# Patient Record
(demographics unavailable — no encounter records)

---

## 2025-01-13 NOTE — PHYSICAL EXAM
[TextEntry] : PHYSICAL EXAMINATION: Vital signs from today's encounter reviewed.  GENERAL: No acute distress, clinically eukinetic, normal appearance HEAD: Normocephalic, atraumatic EYES: conjunctivae are pink and moist, no icterus, no proptosis  NECK: thyroid is not enlarged/nodular on palpation, non-tender, no adenopathy RESPIRATORY: normal chest expansion with good pulmonary effort, no acute respiratory distress MUSCULOSKELETAL: no swelling, normal range of motion, normal gait SKIN: no pallor, no icterus, no rash  NEUROLOGIC: alert and oriented, no evident focal deficits, no tremors  PSYCHIATRIC: mood and affect are normal

## 2025-01-13 NOTE — HISTORY OF PRESENT ILLNESS
[FreeTextEntry1] : CHIEF COMPLAINT: Thyroid nodules   HISTORY OF PRESENTING ILLNESS: The patient is a 49-year-old female being seen in the office today for evaluation of thyroid nodules.  She was initially noted to have thyroid nodules on MRI cervical spine in 4/2023, done for symptoms of paresthesias. Paresthesias have since resolved, after starting vitamin B12 supplements.  Subsequent thyroid US 7/21/2023 (images personally reviewed): Bilateral cysts and spongiform nodules.  Right lobe colloid cysts up to 4 mm, LMP 0.7 cm spongiform nodule, LLP 0.5 cm cyst. Thyroid US 1/5/2025: Bilateral colloid cysts and nodules, largest left upper pole cyst with debris 6 to 7 mm, unchanged.   Symptoms: Endorses hot flashes, decreased energy and brain fog.  Recently started HRT with GYN.  No palpitations or tremors.  Endorses aches and pains all over her body.  No constipation or diarrhea.  She is perimenopausal. Compressive symptoms: No neck swelling, no dysphagia, no dyspnea, no change in voice.   Family history: Mother with Hashimoto's disease, subclinical hypothyroidism. No history of biotin intake. No personal history of radiation exposure in the head and neck area. No prior treatment with thyroid medications.  Normal TSH from 7/2024.  Family history: Mother with hyperlipidemia, early menopause at age 42, osteoporosis, Hashimoto's thyroiditis, subclinical hypothyroidism, Sjogren's syndrome, soft tissue sarcoma.  Father with hyperlipidemia.  Social history: She is a psychologist.  Lives with her  and children.   is a neuropsychologist.

## 2025-01-13 NOTE — ASSESSMENT
[FreeTextEntry1] : 1.  Subcentimeter thyroid nodules Thyroid US 7/21/2023: Bilateral cysts and spongiform nodules. Right lobe colloid cysts up to 4 mm, LMP 0.7 cm spongiform nodule, LLP 0.5 cm cyst. Thyroid US 1/5/2025: Bilateral colloid cysts and nodules, largest left upper pole cyst with debris 6 to 7 mm, unchanged. RECOMMENDATIONS: -We discussed that she has bilateral cysts and nonsuspicious subcentimeter nodules.  Do not meet criteria for FNA.  Given interval stability, can repeat thyroid ultrasound in 2 years.  2.  Bone health 3.  Vitamin D deficiency Family history of osteoporosis in mother, family history of early menopause at age 42 and mother. She is not currently menopausal. -Continue vitamin D 4000 IU daily, she gets labs with PCP -Bone density screening starting at age 65  RTC in 2 years with previsit ultrasound.  Robb Montelongo MD [facility] at [address], [address] Ph: [phone] Fax: [phone]

## 2025-01-14 NOTE — PHYSICAL EXAM
[General Appearance - Alert] : alert [General Appearance - Well Nourished] : well nourished [General Appearance - Well-Appearing] : healthy appearing [FreeTextEntry3] : Vascular Exam: DP 2/4 bilaterally, PT 2/4 bilaterally, Temperature gradient normal, Capillary return instant x 10 [de-identified] : Orthopedic Exam: No structural deformities present. [FreeTextEntry1] : Neurological Exam: Sharp / Dull - L: WNL. Sharp / Dull - R: WNL. Light Touch/pressure - L: WNL. Light Touch/pressure - R: WNL. Hot/cold - L: WNL. Hot/cold - R: WNL. Vibratory - L: WNL. Vibratory - R: WNL.

## 2025-01-14 NOTE — HISTORY OF PRESENT ILLNESS
[FreeTextEntry1] : Diana is a 49-year-old female who presents this afternoon complaining of nail fungus.  Her daughter painted her toenails, which she doesn't normally do, over the summer.  She forgot about it and left the polish on for months.  When she removed it she had white spots on her big toes.  She bought OTC gel and applied it for a while.  She also tried vicks vapor rub.  She filed the nails which seemed to help.  She never had any pain.

## 2025-01-14 NOTE — PROCEDURE
[FreeTextEntry1] : Plan:  Examination.  (Ql=[id number]).  We had a lengthy discussion concerning the patient's condition.  Nail biopsy was performed of both 1st toenails. ([id number]).  Results to be reviewed when available.

## 2025-03-14 NOTE — PHYSICAL EXAM
[General Appearance - Alert] : alert [General Appearance - Well Nourished] : well nourished [General Appearance - Well-Appearing] : healthy appearing [FreeTextEntry3] : Vascular Exam: DP 2/4 bilaterally, PT 2/4 bilaterally, Temperature gradient normal, Capillary return instant x 10 [de-identified] : Orthopedic Exam: No structural deformities present. [FreeTextEntry1] : Neurological Exam: Sharp / Dull - L: WNL. Sharp / Dull - R: WNL. Light Touch/pressure - L: WNL. Light Touch/pressure - R: WNL. Hot/cold - L: WNL. Hot/cold - R: WNL. Vibratory - L: WNL. Vibratory - R: WNL.

## 2025-03-14 NOTE — HISTORY OF PRESENT ILLNESS
[FreeTextEntry1] : Diana is a 49-year-old female who presents to the office this afternoon for follow up care of fungus in her big toenails.  Her biopsy came back positive for fungus. She has been applying Formula 7 daily.

## 2025-03-14 NOTE — PROCEDURE
[FreeTextEntry1] : Plan:  Examination.  ([id number]=[id number]).  Debridement of the mycotic toenails by manual means and electric  to patient tolerance.  Patient to continue Formula 7 daily. Patient to return: 1 month.

## 2025-04-25 NOTE — PHYSICAL EXAM
[General Appearance - Alert] : alert [General Appearance - Well Nourished] : well nourished [General Appearance - Well-Appearing] : healthy appearing [FreeTextEntry3] : Vascular Exam: DP 2/4 bilaterally, PT 2/4 bilaterally, Temperature gradient normal, Capillary return instant x 10 [de-identified] : Orthopedic Exam: No structural deformities present. [FreeTextEntry1] : Neurological Exam: Sharp / Dull - L: WNL. Sharp / Dull - R: WNL. Light Touch/pressure - L: WNL. Light Touch/pressure - R: WNL. Hot/cold - L: WNL. Hot/cold - R: WNL. Vibratory - L: WNL. Vibratory - R: WNL.

## 2025-04-25 NOTE — PROCEDURE
[FreeTextEntry1] : Plan:  Examination.  ([id number]).  We had a lengthy discussion concerning the patient's condition.  Debridement of the previously mycotic toenail on the right removing the last of the mycosis.  We deferred treatment on the left 1st toenail at the patient's request.  She is to continue Formula 7 on this toe daily. Patient to return: 1 month.

## 2025-04-25 NOTE — PHYSICAL EXAM
[Well Developed] : well developed [Well Nourished] : well nourished [No Acute Distress] : no acute distress [Normal Conjunctiva] : normal conjunctiva [Normal Venous Pressure] : normal venous pressure [No Carotid Bruit] : no carotid bruit [Normal S1, S2] : normal S1, S2 [No Murmur] : no murmur [No Gallop] : no gallop [Clear Lung Fields] : clear lung fields [Good Air Entry] : good air entry [No Respiratory Distress] : no respiratory distress  [Soft] : abdomen soft [Non Tender] : non-tender [Normal Bowel Sounds] : normal bowel sounds [Normal Gait] : normal gait [No Edema] : no edema [No Cyanosis] : no cyanosis [No Varicosities] : no varicosities [No Rash] : no rash [No Skin Lesions] : no skin lesions [Moves all extremities] : moves all extremities [No Focal Deficits] : no focal deficits [Alert and Oriented] : alert and oriented

## 2025-04-27 NOTE — CARDIOLOGY SUMMARY
[de-identified] : 4/25/2025:  Sinus Rhythm with first degree A-V block  BORDERLINE RHYTHM [de-identified] : 8/18/2023:  normal ECG response to 10 METs of exercise with normal BP response [de-identified] : 7/28/2023:  LVEF 59%, no significant valve disease

## 2025-04-27 NOTE — REVIEW OF SYSTEMS
[Weight Gain (___ Lbs)] : [unfilled] ~Ulb weight gain [Sinus Pressure] : sinus pressure [Orthopnea] : orthopnea [PND] : PND [Under Stress] : under stress [Negative] : Heme/Lymph [Dyspnea on exertion] : not dyspnea during exertion [Chest Discomfort] : no chest discomfort [Lower Ext Edema] : no extremity edema [Palpitations] : no palpitations [FreeTextEntry4] : seasonal allergies [FreeTextEntry5] : see HPI [FreeTextEntry6] : can get wheezing with asthma exacerbations [FreeTextEntry8] : recent evaluation for hematuria - resolved

## 2025-04-27 NOTE — ASSESSMENT
[FreeTextEntry1] : 49 year old woman with a history of asthma and a remote family history of coronary disease with non-cardiac chest pain episodically and palpitations.  She was evaluated at an urgent care pre-pandemic for atypical chest pain and was found to have first degree AV block on her ECG.  She also noted frequent palpitations and Holter monitor in June 2022 with no significant arrhythmia with first degree AV block and an episode of Wenkebach.    She is now on HRT and feels markedly improved with better sleep, mood and energy.  Will obtain labs to assess for cardiac risk and refer to Dr. Shelby Meade for weight management.  She will continue HRT and follow up annually or sooner if symptoms change.

## 2025-04-27 NOTE — CARDIOLOGY SUMMARY
[de-identified] : 4/25/2025:  Sinus Rhythm with first degree A-V block  BORDERLINE RHYTHM [de-identified] : 8/18/2023:  normal ECG response to 10 METs of exercise with normal BP response [de-identified] : 7/28/2023:  LVEF 59%, no significant valve disease

## 2025-04-27 NOTE — HISTORY OF PRESENT ILLNESS
[FreeTextEntry1] : 49 year old woman with a history of asthma and a remote family history of coronary disease with non-cardiac chest pain episodically and palpitations.  She was evaluated at an urgent care pre-pandemic for atypical chest pain and was found to have first degree AV block on her ECG.  Echocardiogram 4/28/2022 with normal LV function and no significant valvular disease.  She also noted frequent palpitations and Holter monitor in June 2022 with no significant arrhythmia with first degree AV block and an episode of Wenkebach.    She no longer notes palpitations and feels like it may be menopausal symptoms.  She is now on HRT and feels markedly improved with better sleep, mood and energy She is active with no chest pain, dyspnea or further palpitations. She is working on losing weight and ECG today with stable first degree AV block.

## 2025-07-08 NOTE — ASSESSMENT
[FreeTextEntry1] :  Recommendations:  -discussed Nutrition focus of WF/PB for optimal calorie reduction -discussed avoidance of oils/fats and sugars/artificial sweeteners -avoid processed/refined carbs -minimize animal products -have 3 distinct meals, eat to fullness, avoid snacking in between or after dinner -majority of calories to be earlier in the day -goal water intake - 64 oz per day- needs to increase  -goal physical activity- moderate activity 30mins 5x/week and increase NABEEL -adequate sleep (7+ hours, uninterrupted) was emphasized -  metabolic labs MAY: , needs HA1c  - prescribe: metformin 1000mg, esc to 1500mg, GI s/e d/w her  - d/w her cost of GLP-1- may consider if needed  - nutrition resources   Return to [facility] in 4-6 weeks .

## 2025-07-08 NOTE — HISTORY OF PRESENT ILLNESS
[FreeTextEntry1] :   PATIENT WAS NOTIFIED THAT ANYTHING WE DISCUSSED IN OUR MEETING TODAY MAY BE REFLECTED IN WRITING IN THE VISIT NOTE WHICH WILL BE AVAILABLE TO OTHER MEDICAL PROVIDERS TO REVIEW AS PART OF ROUTINE PATIENT CARE.  PATIENT VERBALLY AGREED.   49 year old female who presents for evaluation and treatment of obesity. referred by cardio- f/u for 1st deg AVB sees Endo- f/u for thyroid nodules, unremarkable   Bariatric surgery history: no  Obesity co-morbidities: HLD Anti-obesity medications:  no  Obesity medication side effects: PMH/PSH c-s x2  FH no thyroid ca SH no smoking, no etoh     SOCIAL/STRESSORS: Patient lives - w/ , 2 dtrs- 17 twin, son in college Employment status -  psychologist, self employed, PT, mostly sitting  Stressor: dtr has upper GI dyspepsia and nausea    WEIGHT HISTORY: Lowest adult weight: 120s (when stressed, no appetite) Highest adult weight: 180 Current weight: 178 Height: 5'5    Has gained/lost over the past year? STABLE    Weight gain has occurred with: , slightly w/ pregnancies   Past weight loss attempts include: Noom (starting in Jan)- not effective, tried "anti-inflammatory diet" - dropped to 155     SLEEP: sleeps/wakes: 11-12/ 0800 Uninterrupted? mostly, can be woken by family  Snoring? no  Daytime sleepiness/yawning/naps/fatigue? yes, but improved w/ HRT   PHYSICAL ACTIVITY: Current physical activity: was restricted d/t health issues which was taken care of by last Dec. Walks when travels.      FOOD: B: 9, HB egg whites w/veggie on high fiber EM, Tea w/ sugar L: 12-1, nothing fried/creamy, cooks- cauliflower/broccoli- steamed, ground turkey, brown rice and quinoa- steamed,  D:  6p, similar to lunch    snacks: (L/D)- fruit, protein bar  eating after dinner: 8pm- popcorn - feels hungry overeating episodes: not recently, used to do this when kids younger    Sodas/fast food/Take Out/Eat Out: no soda, no FF, no TO in past year, no EO   Water intake per day: 3-4 glasses/day    Intake form reviewed.